# Patient Record
Sex: FEMALE | Race: WHITE | NOT HISPANIC OR LATINO | Employment: FULL TIME | URBAN - METROPOLITAN AREA
[De-identification: names, ages, dates, MRNs, and addresses within clinical notes are randomized per-mention and may not be internally consistent; named-entity substitution may affect disease eponyms.]

---

## 2021-06-29 ENCOUNTER — HOSPITAL ENCOUNTER (EMERGENCY)
Facility: HOSPITAL | Age: 71
Discharge: HOME | End: 2021-06-29
Attending: EMERGENCY MEDICINE
Payer: MEDICARE

## 2021-06-29 VITALS
DIASTOLIC BLOOD PRESSURE: 81 MMHG | TEMPERATURE: 98 F | HEART RATE: 82 BPM | OXYGEN SATURATION: 99 % | RESPIRATION RATE: 16 BRPM | SYSTOLIC BLOOD PRESSURE: 165 MMHG | HEIGHT: 63 IN | WEIGHT: 167 LBS | BODY MASS INDEX: 29.59 KG/M2

## 2021-06-29 DIAGNOSIS — K57.92 DIVERTICULITIS: Primary | ICD-10-CM

## 2021-06-29 LAB
ALBUMIN SERPL BCP-MCNC: 3.9 G/DL (ref 3.5–5.2)
ALP SERPL-CCNC: 83 U/L (ref 55–135)
ALT SERPL W/O P-5'-P-CCNC: 16 U/L (ref 10–44)
ANION GAP SERPL CALC-SCNC: 10 MMOL/L (ref 8–16)
AST SERPL-CCNC: 18 U/L (ref 10–40)
BASOPHILS # BLD AUTO: 0.08 K/UL (ref 0–0.2)
BASOPHILS NFR BLD: 0.8 % (ref 0–1.9)
BILIRUB SERPL-MCNC: 0.6 MG/DL (ref 0.1–1)
BILIRUB UR QL STRIP: NEGATIVE
BUN SERPL-MCNC: 10 MG/DL (ref 8–23)
CALCIUM SERPL-MCNC: 8.9 MG/DL (ref 8.7–10.5)
CHLORIDE SERPL-SCNC: 103 MMOL/L (ref 95–110)
CLARITY UR: CLEAR
CO2 SERPL-SCNC: 26 MMOL/L (ref 23–29)
COLOR UR: YELLOW
CREAT SERPL-MCNC: 0.8 MG/DL (ref 0.5–1.4)
DIFFERENTIAL METHOD: ABNORMAL
EOSINOPHIL # BLD AUTO: 0.2 K/UL (ref 0–0.5)
EOSINOPHIL NFR BLD: 2.3 % (ref 0–8)
ERYTHROCYTE [DISTWIDTH] IN BLOOD BY AUTOMATED COUNT: 14.6 % (ref 11.5–14.5)
EST. GFR  (AFRICAN AMERICAN): >60 ML/MIN/1.73 M^2
EST. GFR  (NON AFRICAN AMERICAN): >60 ML/MIN/1.73 M^2
GLUCOSE SERPL-MCNC: 111 MG/DL (ref 70–110)
GLUCOSE UR QL STRIP: NEGATIVE
HCT VFR BLD AUTO: 35.1 % (ref 37–48.5)
HGB BLD-MCNC: 11.7 G/DL (ref 12–16)
HGB UR QL STRIP: NEGATIVE
IMM GRANULOCYTES # BLD AUTO: 0.03 K/UL (ref 0–0.04)
IMM GRANULOCYTES NFR BLD AUTO: 0.3 % (ref 0–0.5)
KETONES UR QL STRIP: ABNORMAL
LEUKOCYTE ESTERASE UR QL STRIP: NEGATIVE
LIPASE SERPL-CCNC: 21 U/L (ref 4–60)
LYMPHOCYTES # BLD AUTO: 3.2 K/UL (ref 1–4.8)
LYMPHOCYTES NFR BLD: 32.1 % (ref 18–48)
MCH RBC QN AUTO: 30.2 PG (ref 27–31)
MCHC RBC AUTO-ENTMCNC: 33.3 G/DL (ref 32–36)
MCV RBC AUTO: 91 FL (ref 82–98)
MONOCYTES # BLD AUTO: 0.8 K/UL (ref 0.3–1)
MONOCYTES NFR BLD: 7.6 % (ref 4–15)
NEUTROPHILS # BLD AUTO: 5.7 K/UL (ref 1.8–7.7)
NEUTROPHILS NFR BLD: 56.9 % (ref 38–73)
NITRITE UR QL STRIP: NEGATIVE
NRBC BLD-RTO: 0 /100 WBC
PH UR STRIP: 7 [PH] (ref 5–8)
PLATELET # BLD AUTO: 310 K/UL (ref 150–450)
PMV BLD AUTO: 10.5 FL (ref 9.2–12.9)
POTASSIUM SERPL-SCNC: 3.8 MMOL/L (ref 3.5–5.1)
PROT SERPL-MCNC: 7.6 G/DL (ref 6–8.4)
PROT UR QL STRIP: NEGATIVE
RBC # BLD AUTO: 3.87 M/UL (ref 4–5.4)
SODIUM SERPL-SCNC: 139 MMOL/L (ref 136–145)
SP GR UR STRIP: 1.01 (ref 1–1.03)
URN SPEC COLLECT METH UR: ABNORMAL
UROBILINOGEN UR STRIP-ACNC: NEGATIVE EU/DL
WBC # BLD AUTO: 10.07 K/UL (ref 3.9–12.7)

## 2021-06-29 PROCEDURE — 80053 COMPREHEN METABOLIC PANEL: CPT | Performed by: EMERGENCY MEDICINE

## 2021-06-29 PROCEDURE — 25500020 PHARM REV CODE 255: Performed by: EMERGENCY MEDICINE

## 2021-06-29 PROCEDURE — 25000003 PHARM REV CODE 250: Performed by: EMERGENCY MEDICINE

## 2021-06-29 PROCEDURE — 99285 EMERGENCY DEPT VISIT HI MDM: CPT | Mod: 25

## 2021-06-29 PROCEDURE — 83690 ASSAY OF LIPASE: CPT | Performed by: EMERGENCY MEDICINE

## 2021-06-29 PROCEDURE — 96374 THER/PROPH/DIAG INJ IV PUSH: CPT | Mod: 59

## 2021-06-29 PROCEDURE — 85025 COMPLETE CBC W/AUTO DIFF WBC: CPT | Performed by: EMERGENCY MEDICINE

## 2021-06-29 PROCEDURE — 81003 URINALYSIS AUTO W/O SCOPE: CPT | Performed by: EMERGENCY MEDICINE

## 2021-06-29 PROCEDURE — 63600175 PHARM REV CODE 636 W HCPCS: Performed by: EMERGENCY MEDICINE

## 2021-06-29 RX ORDER — ACETAMINOPHEN 500 MG
500 TABLET ORAL EVERY 6 HOURS PRN
Qty: 13 TABLET | Refills: 0 | Status: SHIPPED | OUTPATIENT
Start: 2021-06-29

## 2021-06-29 RX ORDER — AMOXICILLIN AND CLAVULANATE POTASSIUM 875; 125 MG/1; MG/1
1 TABLET, FILM COATED ORAL
Status: COMPLETED | OUTPATIENT
Start: 2021-06-29 | End: 2021-06-29

## 2021-06-29 RX ORDER — KETOROLAC TROMETHAMINE 30 MG/ML
15 INJECTION, SOLUTION INTRAMUSCULAR; INTRAVENOUS
Status: COMPLETED | OUTPATIENT
Start: 2021-06-29 | End: 2021-06-29

## 2021-06-29 RX ORDER — AMOXICILLIN AND CLAVULANATE POTASSIUM 875; 125 MG/1; MG/1
1 TABLET, FILM COATED ORAL EVERY 8 HOURS
Qty: 30 TABLET | Refills: 0 | Status: SHIPPED | OUTPATIENT
Start: 2021-06-29 | End: 2021-07-09

## 2021-06-29 RX ADMIN — IOHEXOL 75 ML: 350 INJECTION, SOLUTION INTRAVENOUS at 10:06

## 2021-06-29 RX ADMIN — AMOXICILLIN AND CLAVULANATE POTASSIUM 1 TABLET: 875; 125 TABLET, FILM COATED ORAL at 11:06

## 2021-06-29 RX ADMIN — KETOROLAC TROMETHAMINE 15 MG: 30 INJECTION, SOLUTION INTRAMUSCULAR; INTRAVENOUS at 10:06

## 2024-06-18 PROCEDURE — 99284 EMERGENCY DEPT VISIT MOD MDM: CPT

## 2024-06-19 ENCOUNTER — HOSPITAL ENCOUNTER (EMERGENCY)
Facility: HOSPITAL | Age: 74
Discharge: HOME | End: 2024-06-19
Attending: STUDENT IN AN ORGANIZED HEALTH CARE EDUCATION/TRAINING PROGRAM
Payer: MEDICARE

## 2024-06-19 VITALS
DIASTOLIC BLOOD PRESSURE: 78 MMHG | WEIGHT: 168 LBS | HEIGHT: 63 IN | TEMPERATURE: 98 F | OXYGEN SATURATION: 97 % | SYSTOLIC BLOOD PRESSURE: 170 MMHG | BODY MASS INDEX: 29.77 KG/M2 | RESPIRATION RATE: 18 BRPM | HEART RATE: 66 BPM

## 2024-06-19 DIAGNOSIS — S80.00XA KNEE CONTUSION: ICD-10-CM

## 2024-06-19 DIAGNOSIS — R07.89 CHEST WALL PAIN: ICD-10-CM

## 2024-06-19 DIAGNOSIS — S50.00XA ELBOW CONTUSION: ICD-10-CM

## 2024-06-19 PROCEDURE — 25000003 PHARM REV CODE 250: Performed by: STUDENT IN AN ORGANIZED HEALTH CARE EDUCATION/TRAINING PROGRAM

## 2024-06-19 RX ORDER — OXYCODONE HYDROCHLORIDE 5 MG/1
5 TABLET ORAL
Status: COMPLETED | OUTPATIENT
Start: 2024-06-19 | End: 2024-06-19

## 2024-06-19 RX ADMIN — OXYCODONE 5 MG: 5 TABLET ORAL at 01:06

## 2024-06-19 NOTE — ED PROVIDER NOTES
"Encounter Date: 6/18/2024       History     Chief Complaint   Patient presents with    Fall     Pt presents to the ED with c/o fall she states she tripped over her slipper and broke her fall with right arm and knee pain. Pt denies hitting her head or taking blood thinners.       HPI  73-year-old female history of prior right patella fracture per patient, presents status post mechanical ground level fall.  Denies head injury.  Landed on her right knee, and right elbow.  Complains of pain only at these locations, as well as on the right lateral chest wall where the elbow subsequently impacted.  She denies any other systemic or infectious symptoms or other acute complaints.    Presents and a self with self applied each knee immobilizer that she had from the last injury.  Review of patient's allergies indicates:   Allergen Reactions    Vicodin [hydrocodone-acetaminophen] Palpitations and Other (See Comments)     "shaking"     No past medical history on file.  History reviewed. No pertinent surgical history.  No family history on file.   Medical, surgical, family, and social history reviewed and considered medical decision-making  Patient is not on anticoagulants  Review of Systems  Complete review of systems was conducted and was negative except as per HPI and as marked  Physical Exam     Initial Vitals [06/18/24 2322]   BP Pulse Resp Temp SpO2   (!) 190/85 75 19 98.1 °F (36.7 °C) 98 %      MAP       --         Physical Exam  Physical  General: Awake, alert, no acute distress  Head: Normocephalic, atraumatic  Neck: Trachea midline, full range of motion, no meningismus  ENT: Atraumatic, Airway Patent  Cardio: Regular Rate, Regular Rhythm, Heart Sounds Normal, Capillary refill normal  Chest: Atraumatic, No respiratory distress no use of accessory muscles to breath, normal rate, sounds even and clear to auscultation  Abdomen: Soft, Nontender, no involuntary guarding, rigidity, or rebound.  Upper Ext:  Tenderness over the " olecranon on the right elbow, otherwise Atraumatic, Inspection normal, no swelling  Lower Ext:  Tenderness over the patella on the right knee, otherwise limb is warm well perfused and neurovascularly intact, otherwise Atraumatic, Inspection normal, no swelling  Neuro: No gross cranial nerve abnormality, no lateralization, no gross sensory or motor deficits  Abdomen: Soft, Nontender, no involuntary guarding, rigidity, or rebound.  ED Course   Procedures  Labs Reviewed - No data to display       Imaging Results              X-Ray Knee 3 View Right (Final result)  Result time 06/19/24 02:28:25      Final result by Adrian Saunders MD (06/19/24 02:28:25)                   Impression:      No acute findings evident the right knee.      Electronically signed by: Adrian Saunders  Date:    06/19/2024  Time:    02:28               Narrative:    EXAMINATION:  XR KNEE 3 VIEW RIGHT    CLINICAL HISTORY:  Contusion of unspecified knee, initial encounter    TECHNIQUE:  AP, lateral, and Merchant views of the right knee were performed.    COMPARISON:  None    FINDINGS:  Bones, joint spaces and soft tissues appear intact.  No fracture or effusion.  Mild degenerative changes.                                       X-Ray Elbow Complete Right (Final result)  Result time 06/19/24 02:26:02      Final result by Adrian Saunders MD (06/19/24 02:26:02)                   Impression:      Degenerative changes in the right elbow with no acute fracture or dislocation.      Electronically signed by: Adiran Saunders  Date:    06/19/2024  Time:    02:26               Narrative:    EXAMINATION:  XR ELBOW COMPLETE 3 VIEW RIGHT    CLINICAL HISTORY:  . Contusion of unspecified elbow, initial encounter    TECHNIQUE:  AP, lateral, and oblique views of the right elbow were performed.    COMPARISON:  None    FINDINGS:  Degenerative changes are noted throughout.  No fracture or effusion or dislocation.  No foreign body.                                        X-Ray Chest AP Portable (Final result)  Result time 06/19/24 02:25:24      Final result by Adrian Saunders MD (06/19/24 02:25:24)                   Impression:      No acute abnormality.      Electronically signed by: Adrian Saunders  Date:    06/19/2024  Time:    02:25               Narrative:    EXAMINATION:  XR CHEST AP PORTABLE    CLINICAL HISTORY:  Other chest pain    TECHNIQUE:  Single frontal view of the chest was performed.    COMPARISON:  07/30/2008    FINDINGS:  The lungs are clear, with normal appearance of pulmonary vasculature and no pleural effusion or pneumothorax.    The cardiac silhouette is normal in size. The hilar and mediastinal contours are unremarkable.    Bones are intact.  Degenerative changes in the shoulders right greater than left.                                       Medications   oxyCODONE immediate release tablet 5 mg (5 mg Oral Given 6/19/24 0152)     Medical Decision Making  Amount and/or Complexity of Data Reviewed  Radiology: ordered.    Risk  Prescription drug management.                73-year-old female presents status post mechanical ground level fall.  She is hemodynamically stable in no acute distress, all injured limbs are warm well perfused and neurovascularly intact without compartment syndrome.  X-rays were done of the affected areas to rule out fracture of fractured elbow, or fractured patella, fractured knee, or rib injury.  All these were reviewed, negative.  Reassurance provided.  Counseled on rest ice compress and elevate therapy.    Patient seen in the Emergency department, which included consideration and evaluation for life and limb threatening medical conditions including the history, exam, timely review and interpretation of studies.   All labs and imaging ordered for this encountered were reviewed and included in medical decision making.   Additional information for this case was obtained from discussion with:  Family present with the  patient  Higher complexity treatments proveded during this time include:  Opioid x1                      Clinical Impression:  Final diagnoses:  [S80.00XA] Knee contusion  [S50.00XA] Elbow contusion  [R07.89] Chest wall pain          ED Disposition Condition    Discharge Stable          ED Prescriptions    None       Follow-up Information    None          Cleveland Delatorre MD  06/19/24 0356

## 2024-06-19 NOTE — ED NOTES
Patient presents to ED from home with c/o R arm and R knee pain from fall that occurred tonight. Patient states she was walking when she tripped over her slippers. Denies chest pain or SOB. Denies hitting head or LOC. Denies blood thinner use.

## 2024-06-19 NOTE — DISCHARGE INSTRUCTIONS
Follow-up with your primary care doctor as needed, use medications as prescribed, keep ice on the injured areas whenever resting, and Ace wraps for support.  Return to the emergency department if condition worsens in any way.

## 2025-02-10 DIAGNOSIS — G56.03 CARPAL TUNNEL SYNDROME ON BOTH SIDES: Primary | ICD-10-CM

## 2025-02-10 DIAGNOSIS — M25.511 RIGHT SHOULDER PAIN: ICD-10-CM

## 2025-02-10 DIAGNOSIS — M25.521 RIGHT ELBOW PAIN: ICD-10-CM

## 2025-02-14 ENCOUNTER — CLINICAL SUPPORT (OUTPATIENT)
Dept: REHABILITATION | Facility: HOSPITAL | Age: 75
End: 2025-02-14
Payer: MEDICARE

## 2025-02-14 DIAGNOSIS — M25.511 RIGHT SHOULDER PAIN: ICD-10-CM

## 2025-02-14 DIAGNOSIS — G56.03 CARPAL TUNNEL SYNDROME ON BOTH SIDES: ICD-10-CM

## 2025-02-14 DIAGNOSIS — M25.511 CHRONIC RIGHT SHOULDER PAIN: Primary | ICD-10-CM

## 2025-02-14 DIAGNOSIS — G89.29 CHRONIC RIGHT SHOULDER PAIN: Primary | ICD-10-CM

## 2025-02-14 DIAGNOSIS — M25.521 RIGHT ELBOW PAIN: ICD-10-CM

## 2025-02-14 PROCEDURE — 97530 THERAPEUTIC ACTIVITIES: CPT | Mod: PN

## 2025-02-14 PROCEDURE — 97010 HOT OR COLD PACKS THERAPY: CPT | Mod: PN

## 2025-02-14 PROCEDURE — 97166 OT EVAL MOD COMPLEX 45 MIN: CPT | Mod: PN

## 2025-02-14 NOTE — PATIENT INSTRUCTIONS
Flexibility: Corner Stretch        Standing in corner with hands just above shoulder level, lean forward until a comfortable stretch is felt across chest. Hold 10 seconds.  Repeat 10 times. Do 2 sessions per day.      Scapular Retraction (Standing)        With arms at sides, pinch shoulder blades together.  Repeat 10 times. Do 2 sessions per day.      ROM: Abduction (Standing)        Bring arms straight out from sides and raise as high as possible without pain.  Repeat 10 times. Do 2 sessions per day.    ROM: Flexion (Standing)        Bring arms straight out in front and raise as high as possible without pain. Keep palms facing up.  Repeat 10 times. Do 2 sessions per day.    ROM: Extension (Standing)        Bring arms straight back as far as possible without pain.  Repeat 10 times. Do 2 sessions per day.

## 2025-02-15 PROBLEM — M25.511 CHRONIC RIGHT SHOULDER PAIN: Status: ACTIVE | Noted: 2025-02-15

## 2025-02-15 PROBLEM — G89.29 CHRONIC RIGHT SHOULDER PAIN: Status: ACTIVE | Noted: 2025-02-15

## 2025-02-15 NOTE — PROGRESS NOTES
Outpatient Rehab    Occupational Therapy Evaluation    Patient Name: Pebbles Quinn  MRN: [id number]  YOB: 1950  Today's Date: 2/15/2025    Therapy Diagnosis:   Encounter Diagnoses   Name Primary?    Carpal tunnel syndrome on both sides     Right shoulder pain     Right elbow pain     Chronic right shoulder pain Yes     Physician: Kendrick Light MD    Physician Orders: Eval and Treat  Medical Diagnosis:   G56.03 (ICD-10-CM) - Carpal tunnel syndrome on both sides   M25.511 (ICD-10-CM) - Right shoulder pain   M25.521 (ICD-10-CM) - Right elbow pain       Visit # / Visits Authorized:  1 / 1    Date of Evaluation:  2/14/2025   Insurance Authorization Period: 2/10/2025 to 2/10/2026  Plan of Care Certification:  2/14/2025 to 5/14/2025      Time In: 1400   Time Out: 1500  Total Time: 60   Total Billable Time: 60    Intake Outcome Measure for FOTO Survey    Therapist reviewed FOTO scores for Pebbles Quinn on 2/14/2025.   FOTO report - see Media section or FOTO account episode details.     Intake Score: 35%         Subjective   History of Present Illness  Pebbles is a 74 y.o. female who reports to occupational therapy with a chief concern of Right shoulder pain. According to the patient's chart, Pebbles has no past medical history on file. Pebbles has no past surgical history on file.    The patient reports a medical diagnosis of G56.03 (ICD-10-CM) - Carpal tunnel syndrome on both sides  M25.511 (ICD-10-CM) - Right shoulder pain  M25.521 (ICD-10-CM) - Right elbow pain.    Diagnostic tests related to this condition: X-ray.   X-Ray Details: Degenerative changes in the right elbow with no acute fracture or dislocation.    Dominant Hand: Right  History of Present Condition/Illness: Pt w/ onset right shoulder pain x 6 mos, denies MARIA ESTHER.    Activities of Daily Living  Social history was obtained from Patient.          Patient Responsibilities: Community mobility, Driving, Financial management, Home management, Health  "management, Laundry, Meal prep, Shopping, Personal ADL    Previously independent with activities of daily living? Yes     Currently independent with activities of daily living? Yes          Previously independent with instrumental activities of daily living? Yes     Currently independent with instrumental activities of daily living? Yes              Pain     Patient reports a current pain level of 6/10. Pain at best is reported as 3/10. Pain at worst is reported as 8/10.   Location: Right ant/superior shld  Clinical Progression (since onset): Worsening  Pain Qualities: Aching  Pain-Relieving Factors: Activity modification, Rest, Other (Comment)  Other Pain-Relieving Factors: Pain meds         Employment  Patient reports: Does the patient's condition impact their ability to work?  Employment Status: Employed full-time   Pt cleans buildings/houses      Past Medical History/Physical Systems Review:   Pebbles Quinn  has no past medical history on file.    Pebbles Quinn  has no past surgical history on file.    Pebbles DONOVAN has a current medication list which includes the following prescription(s): acetaminophen.    Review of patient's allergies indicates:   Allergen Reactions    Vicodin [hydrocodone-acetaminophen] Palpitations and Other (See Comments)     "shaking"        Objective   Posture        Shoulders are Rounded. Bilateral scapulae are: Protracted              Upper Extremity Sensation  General Upper Extremity Sensation  Intact: Right  Right Upper Extremity Sensation  Intact: Light Touch, Sharp/Dull Discrimination, Kinesthesia, Proprioception, and Hot/Cold Discrimination  Right Upper Extremity Sensation Stocking Glove Pattern: No                  Shoulder Palpation  Right Shoulder Palpation  Abnormal: Tendon/Ligament     LH biceps tendon tenderness                Shoulder Range of Motion  Right Shoulder   Active (deg) Passive (deg) Pain   Flexion 148       Extension 62       Scaption         ABduction 151     "   ADduction         Horizontal ABduction         Horizontal ADduction         External Rotation (Shoulder ABducted 0 degrees)         External Rotation (Shoulder ABducted 45 degrees)         External Rotation (Shoulder ABducted 90 degrees) 82       Internal Rotation (Shoulder ABducted 0 degrees)         Internal Rotation (Shoulder ABducted 45 degrees)         Internal Rotation (Shoulder ABducted 90 degrees) 67                     Shoulder Strength - Planes of Motion   Right Strength Right Pain Left Strength Left  Pain   Flexion 4+         Extension 5         ABduction 4+         ADduction 4+         Horizontal ABduction 4+         Horizontal ADduction 4+         Internal Rotation 0° 4+         Internal Rotation 90° 5         External Rotation 0° 4+         External Rotation 90° 4+                       Shoulder Special Tests  Rotator Cuff Tests  Positive: Right Empty Can  Impingement Tests  Positive: Right Roman-Mihir           Shoulder Joint Mobility  Right Shoulder Mobility  Normal: Anterior Capsule Mobility and Posterior Capsule Mobility       Right Scapular Mobility  Normal: Superior and Medial  Left Scapular Mobility  Normal: Superior                  Treatment:  Therapeutic Activity  Therapeutic Activity 1: Pathophysiology and joint protection education  Therapeutic Activity 2: HEP training    Modalities  Moist Heat (min): 10 min right shoulder     Assessment & Plan   Assessment  Pebbles presents with a condition of Moderate complexity.   Presentation of Symptoms: Stable  Will Comorbidities Impact Care: No       Rest and Sleep Limitations: Disrupted sleep pattern  Work Limitations: Job performance  Functional Limitations: Activity tolerance, Pain when reaching, Carrying objects, Pain with ADLs/IADLs              Occupational profile: Cleans buildings/houses.   Evaluation/Treatment Response: Patient responded to treatment well, Patient limited by pain, Patient limited by fatigue  Patient Goal for Therapy  (OT): Minimize pain  Prognosis: Fair  Prognosis Details: Physical nature of job activities  Assessment Details: Pt presents w/ limitations of RUE function 2/2 shoulder pain. No evidence of nerve compression or elbow dysfunction.    Plan  From an occupational therapy perspective, the patient would benefit from: Skilled Rehab Services    Planned therapy interventions include: Therapeutic exercise, Therapeutic activities, and Manual therapy.    Planned modalities to include: Thermotherapy (hot pack).        Visit Frequency: 2 times Per Week for 3 Months.       This plan was discussed with Patient.   Discussion participants: Agreed Upon Plan of Care             Patient's spiritual, cultural, and educational needs considered and patient agreeable to plan of care and goals.     Education  Education was done with Patient. The patient's learning style includes Demonstration, Listening, and Pictures/video. The patient Demonstrates understanding and Verbalizes understanding.                 Goals:   Active       LTG's       Pt will report pain of 4/10 at worst w/ activity       Start:  02/14/25    Expected End:  05/15/25            Pt will increase FOTO score to 70%.       Start:  02/14/25    Expected End:  05/15/25               STG's       Pt will report pain of 6/10 at worst w/ activity       Start:  02/14/25    Expected End:  04/04/25            Pt will increase FOTO score to 70%.       Start:  02/14/25    Expected End:  04/04/25            Pt will be independent w/ HEP.       Start:  02/14/25    Expected End:  04/04/25              UMA Sherwood OTR/PERFECTO, CHT

## 2025-02-17 ENCOUNTER — CLINICAL SUPPORT (OUTPATIENT)
Dept: REHABILITATION | Facility: HOSPITAL | Age: 75
End: 2025-02-17
Payer: MEDICARE

## 2025-02-17 DIAGNOSIS — M25.511 CHRONIC RIGHT SHOULDER PAIN: Primary | ICD-10-CM

## 2025-02-17 DIAGNOSIS — G89.29 CHRONIC RIGHT SHOULDER PAIN: Primary | ICD-10-CM

## 2025-02-17 PROCEDURE — 97110 THERAPEUTIC EXERCISES: CPT | Mod: KX,PN

## 2025-02-17 PROCEDURE — 97140 MANUAL THERAPY 1/> REGIONS: CPT | Mod: KX,PN

## 2025-02-17 PROCEDURE — 97010 HOT OR COLD PACKS THERAPY: CPT | Mod: KX,PN

## 2025-02-17 NOTE — PROGRESS NOTES
"  Outpatient Rehab    Occupational Therapy Visit    Patient Name: Pebbles Quinn  MRN: [id number]  YOB: 1950  Today's Date: 2/17/2025    Therapy Diagnosis:   Encounter Diagnosis   Name Primary?    Chronic right shoulder pain Yes     Physician: Kendrick Light MD    Physician Orders: Eval and Treat  Medical Diagnosis:   G56.03 (ICD-10-CM) - Carpal tunnel syndrome on both sides   M25.511 (ICD-10-CM) - Right shoulder pain   M25.521 (ICD-10-CM) - Right elbow pain         Visit # / Visits Authorized:  1 / 10  Date of Evaluation:  2/14/2025   Insurance Authorization Period: 2/10/2025 to 2/10/2026  Plan of Care Certification:  2/14/2025 to 5/14/2025      Time In: 1030   Time Out: 1115  Total Time: 45   Total Billable Time: 45    FOTO:  Intake Score:  %  Survey Score 1:  %  Survey Score 2:  %         Subjective   Performing HEP regularly.  Pain reported as 5/10.      Objective           Treatment:  Therapeutic Exercise  Therapeutic Exercise Activity 1: AROM supine dowel SF 20  Therapeutic Exercise Activity 2: Multidirectional scap stabilization 3;" x 3  Therapeutic Exercise Activity 3: UBE 3'B/3'F  Therapeutic Exercise Activity 4: Reviewed HEP    Manual Therapy  Manual Therapy Activity 1: Soft tissue mobs LH biceps, upper trap/periscap musculature    Modalities  Moist Heat (min): 10 min right shld     Assessment & Plan   Assessment: Pt reported significant relief following tx.  Evaluation/Treatment Tolerance: Patient tolerated treatment well, Patient limited by pain    Patient will continue to benefit from skilled outpatient occupational therapy to address the deficits listed in the problem list box on initial evaluation, provide pt/family education and to maximize pt's level of independence in the home and community environment.     Patient's spiritual, cultural, and educational needs considered and patient agreeable to plan of care and goals.           Plan: Cont OT in pursuit of established goals.    Goals: "   Active       LTG's       Pt will report pain of 4/10 at worst w/ activity (Progressing)       Start:  02/14/25    Expected End:  05/15/25            Pt will increase FOTO score to 70%. (Progressing)       Start:  02/14/25    Expected End:  05/15/25               STG's       Pt will report pain of 6/10 at worst w/ activity (Progressing)       Start:  02/14/25    Expected End:  04/04/25            Pt will increase FOTO score to 70%. (Progressing)       Start:  02/14/25    Expected End:  04/04/25            Pt will be independent w/ HEP. (Progressing)       Start:  02/14/25    Expected End:  04/04/25              UMA Sherwood OTR/L, CHT

## 2025-02-25 ENCOUNTER — CLINICAL SUPPORT (OUTPATIENT)
Dept: REHABILITATION | Facility: HOSPITAL | Age: 75
End: 2025-02-25
Payer: MEDICARE

## 2025-02-25 DIAGNOSIS — M25.511 CHRONIC RIGHT SHOULDER PAIN: Primary | ICD-10-CM

## 2025-02-25 DIAGNOSIS — G89.29 CHRONIC RIGHT SHOULDER PAIN: Primary | ICD-10-CM

## 2025-02-25 PROCEDURE — 97110 THERAPEUTIC EXERCISES: CPT | Mod: KX,PN

## 2025-02-25 PROCEDURE — 97140 MANUAL THERAPY 1/> REGIONS: CPT | Mod: KX,PN

## 2025-02-25 PROCEDURE — 97010 HOT OR COLD PACKS THERAPY: CPT | Mod: KX,PN

## 2025-02-25 NOTE — PROGRESS NOTES
"  Outpatient Rehab    Occupational Therapy Visit    Patient Name: Pebbles Quinn  MRN: [id number]  YOB: 1950  Encounter Date: 2/25/2025    Therapy Diagnosis:   Encounter Diagnosis   Name Primary?    Chronic right shoulder pain Yes     Physician: Kendrick Light MD    Physician Orders: Eval and Treat  Medical Diagnosis:   G56.03 (ICD-10-CM) - Carpal tunnel syndrome on both sides   M25.511 (ICD-10-CM) - Right shoulder pain   M25.521 (ICD-10-CM) - Right elbow pain         Visit # / Visits Authorized:  1 / 1        Date of Evaluation:  2/14/2025   Insurance Authorization Period: 2/10/2025 to 2/10/2026  Plan of Care Certification:  2/14/2025 to 5/14/2025      Time In: 0930   Time Out: 1015  Total Time: 45   Total Billable Time: 45    FOTO:  Intake Score: 35%  Survey Score 1:  %  Survey Score 2:  %         Subjective   Pt reports increased pain today 2/2 activities over the weekend.  Pain reported as 6/10. Right LH biceps, upper trap    Objective           Treatment:  Therapeutic Exercise  Therapeutic Exercise Activity 1: AROM supine dowel SF 20  Therapeutic Exercise Activity 2: Multidirectional scap stabilization 3;" x 3  Therapeutic Exercise Activity 3: UBE 3'B/3'F  Therapeutic Exercise Activity 4: Reviewed HEP    Manual Therapy  Manual Therapy Activity 1: Soft tissue mobs LH biceps, upper trap/periscap musculature    Assessment & Plan   Assessment: No significant change since last visit. Reported feeling better following tx  Evaluation/Treatment Tolerance: Patient tolerated treatment well, Patient limited by pain    Patient will continue to benefit from skilled outpatient occupational therapy to address the deficits listed in the problem list box on initial evaluation, provide pt/family education and to maximize pt's level of independence in the home and community environment.     Patient's spiritual, cultural, and educational needs considered and patient agreeable to plan of care and goals. "     Education  Education was done with Patient. The patient's learning style includes Demonstration, Listening, and Pictures/video. The patient Demonstrates understanding and Verbalizes understanding.                 Plan: Cont OT in pursuit of established goals.    Goals:   Active       LTG's       Pt will report pain of 4/10 at worst w/ activity (Progressing)       Start:  02/14/25    Expected End:  05/15/25            Pt will increase FOTO score to 70%. (Progressing)       Start:  02/14/25    Expected End:  05/15/25               STG's       Pt will report pain of 6/10 at worst w/ activity (Progressing)       Start:  02/14/25    Expected End:  04/04/25            Pt will increase FOTO score to 70%. (Progressing)       Start:  02/14/25    Expected End:  04/04/25            Pt will be independent w/ HEP. (Progressing)       Start:  02/14/25    Expected End:  04/04/25                UMA Sherwood OTR/L, CHT

## 2025-03-07 ENCOUNTER — CLINICAL SUPPORT (OUTPATIENT)
Dept: REHABILITATION | Facility: HOSPITAL | Age: 75
End: 2025-03-07
Payer: MEDICARE

## 2025-03-07 DIAGNOSIS — M25.511 CHRONIC RIGHT SHOULDER PAIN: Primary | ICD-10-CM

## 2025-03-07 DIAGNOSIS — G89.29 CHRONIC RIGHT SHOULDER PAIN: Primary | ICD-10-CM

## 2025-03-07 PROCEDURE — 97010 HOT OR COLD PACKS THERAPY: CPT | Mod: KX,PN

## 2025-03-07 PROCEDURE — 97110 THERAPEUTIC EXERCISES: CPT | Mod: KX,PN

## 2025-03-07 PROCEDURE — 97140 MANUAL THERAPY 1/> REGIONS: CPT | Mod: KX,PN

## 2025-03-07 NOTE — PROGRESS NOTES
Outpatient Rehab    Occupational Therapy Visit    Patient Name: Pebbles Quinn  MRN: [id number]  YOB: 1950  Encounter Date: 3/7/2025    Therapy Diagnosis:   Encounter Diagnosis   Name Primary?    Chronic right shoulder pain Yes     Physician: Kendrick Light MD    Physician Orders: Eval and Treat  Medical Diagnosis:   G56.03 (ICD-10-CM) - Carpal tunnel syndrome on both sides   M25.511 (ICD-10-CM) - Right shoulder pain   M25.521 (ICD-10-CM) - Right elbow pain         Visit # / Visits Authorized:  3 / 10  Date of Evaluation:  2/14/2025   Insurance Authorization Period: 2/10/2025 to 2/10/2026  Plan of Care Certification:  2/14/2025 to 5/14/2025      Time In: 1030   Time Out: 1130  Total Time: 60   Total Billable Time: 60    FOTO:  Intake Score: 35%  Survey Score 1:  %  Survey Score 2:  %         Subjective   Pt reports increased pain today 2/2 activities over the weekend.  Pain reported as 3/10. Right LH biceps, upper trap    Objective           Treatment:  Therapeutic Exercise  Therapeutic Exercise Activity 1: AROM supine dowel SF 20  Therapeutic Exercise Activity 2: Upright rows blue t-band 3x10  Therapeutic Exercise Activity 3: UBE 3'B/3'F    Manual Therapy  Manual Therapy Activity 1: Soft tissue mobs LH biceps, upper trap/periscap musculature bilateral shoulders          Assessment & Plan   Assessment: No significant change since last visit. Reported feeling better following tx  Evaluation/Treatment Tolerance: Patient tolerated treatment well, Patient limited by pain    Patient will continue to benefit from skilled outpatient occupational therapy to address the deficits listed in the problem list box on initial evaluation, provide pt/family education and to maximize pt's level of independence in the home and community environment.     Patient's spiritual, cultural, and educational needs considered and patient agreeable to plan of care and goals.     Education  Education was done with Patient. The  patient's learning style includes Demonstration, Listening, and Pictures/video. The patient Demonstrates understanding and Verbalizes understanding.                 Plan: Cont OT in pursuit of established goals.    Goals:   Active       LTG's       Pt will report pain of 4/10 at worst w/ activity (Progressing)       Start:  02/14/25    Expected End:  05/15/25            Pt will increase FOTO score to 70%. (Progressing)       Start:  02/14/25    Expected End:  05/15/25               STG's       Pt will report pain of 6/10 at worst w/ activity (Progressing)       Start:  02/14/25    Expected End:  04/04/25            Pt will increase FOTO score to 70%. (Progressing)       Start:  02/14/25    Expected End:  04/04/25            Pt will be independent w/ HEP. (Progressing)       Start:  02/14/25    Expected End:  04/04/25                UMA Sherwood, OTR/L,CHT

## 2025-03-11 ENCOUNTER — CLINICAL SUPPORT (OUTPATIENT)
Dept: REHABILITATION | Facility: HOSPITAL | Age: 75
End: 2025-03-11
Payer: MEDICARE

## 2025-03-11 DIAGNOSIS — M25.511 CHRONIC RIGHT SHOULDER PAIN: Primary | ICD-10-CM

## 2025-03-11 DIAGNOSIS — G89.29 CHRONIC RIGHT SHOULDER PAIN: Primary | ICD-10-CM

## 2025-03-11 PROCEDURE — 97140 MANUAL THERAPY 1/> REGIONS: CPT | Mod: PN

## 2025-03-11 PROCEDURE — 97110 THERAPEUTIC EXERCISES: CPT | Mod: PN

## 2025-03-11 PROCEDURE — 97010 HOT OR COLD PACKS THERAPY: CPT | Mod: PN

## 2025-03-11 NOTE — PROGRESS NOTES
Outpatient Rehab    Occupational Therapy Visit    Patient Name: Pebbles Quinn  MRN: [id number]  YOB: 1950  Encounter Date: 3/11/2025    Therapy Diagnosis:   Encounter Diagnosis   Name Primary?    Chronic right shoulder pain Yes     Physician: Kendrick Light MD    Physician Orders: Eval and Treat  Medical Diagnosis:   G56.03 (ICD-10-CM) - Carpal tunnel syndrome on both sides   M25.511 (ICD-10-CM) - Right shoulder pain   M25.521 (ICD-10-CM) - Right elbow pain         Visit # / Visits Authorized:  4 / 10  Date of Evaluation:  2/14/2025   Insurance Authorization Period: 2/10/2025 to 2/10/2026  Plan of Care Certification:  2/14/2025 to 5/14/2025      Time In: 1330   Time Out: 1430  Total Time: 60   Total Billable Time: 60    FOTO:  Intake Score: 35%  Survey Score 1:  %  Survey Score 2:  %         Subjective   Feeling better.  Pain reported as 2/10. Right LH biceps, upper trap    Objective           Treatment:  Therapeutic Exercise  Therapeutic Exercise Activity 1: AROM supine dowel SF 20  Therapeutic Exercise Activity 2: Upright rows blue t-band 3x10  Therapeutic Exercise Activity 3: UBE 3'B/3'F  Therapeutic Exercise Activity 4: Wall slides bilaterllay SF 3x10  Therapeutic Exercise Activity 5: Corner stretches 10 count x 10    Modalities  Moist Heat (min): 10 min right shld     Assessment & Plan   Assessment: Pt improving incrementally, added wall slides to HEP  Evaluation/Treatment Tolerance: Patient tolerated treatment well, Patient limited by pain    Patient will continue to benefit from skilled outpatient occupational therapy to address the deficits listed in the problem list box on initial evaluation, provide pt/family education and to maximize pt's level of independence in the home and community environment.     Patient's spiritual, cultural, and educational needs considered and patient agreeable to plan of care and goals.           Plan:      Goals:   Active       LTG's       Pt will report pain of  4/10 at worst w/ activity (Progressing)       Start:  02/14/25    Expected End:  05/15/25            Pt will increase FOTO score to 70%. (Progressing)       Start:  02/14/25    Expected End:  05/15/25               STG's       Pt will report pain of 6/10 at worst w/ activity (Progressing)       Start:  02/14/25    Expected End:  04/04/25            Pt will increase FOTO score to 70%. (Progressing)       Start:  02/14/25    Expected End:  04/04/25            Pt will be independent w/ HEP. (Progressing)       Start:  02/14/25    Expected End:  04/04/25                UMA Sherwood, OTR/L,CHT

## 2025-03-18 ENCOUNTER — CLINICAL SUPPORT (OUTPATIENT)
Dept: REHABILITATION | Facility: HOSPITAL | Age: 75
End: 2025-03-18
Payer: MEDICARE

## 2025-03-18 DIAGNOSIS — M25.511 CHRONIC RIGHT SHOULDER PAIN: Primary | ICD-10-CM

## 2025-03-18 DIAGNOSIS — G89.29 CHRONIC RIGHT SHOULDER PAIN: Primary | ICD-10-CM

## 2025-03-18 PROCEDURE — 97110 THERAPEUTIC EXERCISES: CPT | Mod: PN

## 2025-03-18 PROCEDURE — 97140 MANUAL THERAPY 1/> REGIONS: CPT | Mod: PN

## 2025-03-18 PROCEDURE — 97010 HOT OR COLD PACKS THERAPY: CPT | Mod: PN

## 2025-03-18 NOTE — PROGRESS NOTES
Outpatient Rehab    Occupational Therapy Progress Note    Patient Name: Pebbles Quinn  MRN: [id number]  YOB: 1950  Encounter Date: 3/18/2025    Therapy Diagnosis:   Encounter Diagnosis   Name Primary?    Chronic right shoulder pain Yes     Physician: Kendrick Light MD    Physician Orders: Eval and Treat  Medical Diagnosis: Carpal tunnel syndrome on both sides  Right shoulder pain  Right elbow pain    Visit # / Visits Authorized: 5 / 10  Date of Evaluation:  2/14/2025   Insurance Authorization Period: 2/10/2025 to 2/10/2026  Plan of Care Certification:  2/14/2025 to 5/14/2025      Time In: 1030   Time Out: 1130  Total Time: 60   Total Billable Time: 60    FOTO:  Intake Score: 35%  Survey Score 1:  %  Survey Score 2:  %         Subjective   Continues to improve slowly. Work activities exacerbate her symptoms..  Pain reported as 4/10. Right LH biceps, upper trap    Objective           Treatment:  Therapeutic Exercise  TE 1: AROM supine dowel SF 20  TE 2: Upright rows blue t-band 3x10  TE 3: UBE 3'B/3'F  TE 4: Wall slides SF/SA 3x10  TE 5: ER red t-band, IR green t-band 3x10  Manual Therapy  MT 1: Soft tissue mobs LH biceps, upper trap/periscap musculature bilateral shoulders  Modalities  Moist Heat (min): 10 min bilateral shld    Time Entry(in minutes):  Hot/Cold Pack Time Entry: 10  Manual Therapy Time Entry: 30  Therapeutic Exercise Time Entry: 20    Assessment & Plan   Assessment: Pt continues to slowly improve  Evaluation/Treatment Tolerance: Patient tolerated treatment well, Patient limited by pain    Patient will continue to benefit from skilled outpatient occupational therapy to address the deficits listed in the problem list box on initial evaluation, provide pt/family education and to maximize pt's level of independence in the home and community environment.     Patient's spiritual, cultural, and educational needs considered and patient agreeable to plan of care and goals.      Education  Education was done with Patient. The patient's learning style includes Demonstration, Listening, and Pictures/video. The patient Demonstrates understanding and Verbalizes understanding.                 Plan: Cont OT in pursuit of established goals.    Goals:   Active       LTG's       Pt will report pain of 4/10 at worst w/ activity (Progressing)       Start:  02/14/25    Expected End:  05/15/25            Pt will increase FOTO score to 70%. (Progressing)       Start:  02/14/25    Expected End:  05/15/25               STG's       Pt will report pain of 6/10 at worst w/ activity (Met)       Start:  02/14/25    Expected End:  04/04/25    Resolved:  03/18/25         Pt will increase FOTO score to 70%. (Progressing)       Start:  02/14/25    Expected End:  04/04/25            Pt will be independent w/ HEP. (Met)       Start:  02/14/25    Expected End:  04/04/25    Resolved:  03/18/25             UMA Sherwood OTR/L,CHT

## 2025-04-01 ENCOUNTER — CLINICAL SUPPORT (OUTPATIENT)
Dept: REHABILITATION | Facility: HOSPITAL | Age: 75
End: 2025-04-01
Payer: MEDICARE

## 2025-04-01 DIAGNOSIS — G89.29 CHRONIC RIGHT SHOULDER PAIN: Primary | ICD-10-CM

## 2025-04-01 DIAGNOSIS — M25.511 CHRONIC RIGHT SHOULDER PAIN: Primary | ICD-10-CM

## 2025-04-01 PROCEDURE — 97110 THERAPEUTIC EXERCISES: CPT | Mod: KX,PN

## 2025-04-01 PROCEDURE — 97140 MANUAL THERAPY 1/> REGIONS: CPT | Mod: KX,PN

## 2025-04-01 PROCEDURE — 97010 HOT OR COLD PACKS THERAPY: CPT | Mod: KX,PN

## 2025-04-01 NOTE — PROGRESS NOTES
Outpatient Rehab    Occupational Therapy Visit    Patient Name: Pebbles Quinn  MRN: [id number]  YOB: 1950  Encounter Date: 4/1/2025    Therapy Diagnosis:   Encounter Diagnosis   Name Primary?    Chronic right shoulder pain Yes     Physician: Kendrick Light MD    Physician Orders: Eval and Treat  Medical Diagnosis: Carpal tunnel syndrome on both sides  Right shoulder pain  Right elbow pain    Visit # / Visits Authorized: 6 / 10  Date of Evaluation:  2/14/2025   Insurance Authorization Period: 2/10/2025 to 2/10/2026  Plan of Care Certification:  2/14/2025 to 5/14/2025      Time In: 1030   Time Out: 1125  Total Time: 55   Total Billable Time:  55    FOTO:  Intake Score: 35%  Survey Score 1: 49%  Survey Score 2:  %         Subjective   Pt feels she is improving steadily.  Pain reported as 3/10. Right LH biceps, upper trap    Objective           Treatment:  Therapeutic Exercise  TE 1: AROM supine dowel SF 20  TE 2: Upright rows blue t-band 3x10  TE 3: UBE 3'B/3'F  TE 4: Wall slides SF/SA 3x10  TE 5: ER red t-band, IR green t-band 3x10  TE 6: PNF V1 red t-band 3x10  TE 7: PNF V2 yellow t-band 3x10  Manual Therapy  MT 1: Soft tissue mobs LH biceps, upper trap/periscap musculature bilateral shoulders  Modalities  Moist Heat (min): 10 min bilateral shld    Time Entry(in minutes):  Hot/Cold Pack Time Entry: 10  Manual Therapy Time Entry: 15  Therapeutic Exercise Time Entry: 30    Assessment & Plan   Assessment:         Patient will continue to benefit from skilled outpatient occupational therapy to address the deficits listed in the problem list box on initial evaluation, provide pt/family education and to maximize pt's level of independence in the home and community environment.     Patient's spiritual, cultural, and educational needs considered and patient agreeable to plan of care and goals.           Plan: Cont OT in pursuit of established goals.    Goals:   Active       LTG's       Pt will report pain of  4/10 at worst w/ activity (Progressing)       Start:  02/14/25    Expected End:  05/15/25            Pt will increase FOTO score to 70%. (Progressing)       Start:  02/14/25    Expected End:  05/15/25               STG       Pt will increase FOTO score to 55%       Start:  04/01/25    Expected End:  04/20/25              Resolved       STG's       Pt will report pain of 6/10 at worst w/ activity (Met)       Start:  02/14/25    Expected End:  04/04/25    Resolved:  03/18/25         Pt will be independent w/ HEP. (Met)       Start:  02/14/25    Expected End:  04/04/25    Resolved:  03/18/25             UMA Sherwood, OTR/L,CHT

## 2025-04-25 ENCOUNTER — DOCUMENTATION ONLY (OUTPATIENT)
Dept: REHABILITATION | Facility: HOSPITAL | Age: 75
End: 2025-04-25
Payer: MEDICARE